# Patient Record
(demographics unavailable — no encounter records)

---

## 2025-03-20 NOTE — PHYSICAL EXAM
[Chaperone Present] : A chaperone was present in the examining room during all aspects of the physical examination [Appropriately responsive] : appropriately responsive [Alert] : alert [No Acute Distress] : no acute distress [Soft] : soft [Non-tender] : non-tender [Non-distended] : non-distended [No HSM] : No HSM [No Lesions] : no lesions [No Mass] : no mass [Oriented x3] : oriented x3 [Normal] : normal [No Discharge] : no discharge [The Right Breast Was Examined] : a normal appearance [Breast Mass Right Breast ___cm] : no was mass palpable [___] : a [unfilled] ~Ucm area of erythema [Enlargement Of The Left Breast] : swelling [Tenderness Of The Left Breast] : tenderness [___cm] : a ~M [unfilled] ~Ucm inferior lateral quadrant mass was palpated

## 2025-03-20 NOTE — PLAN
Anesthesia Start and Stop Event Times     Date Time Event    9/7/2023 1041 Ready for Procedure     1058 Anesthesia Start     1133 Anesthesia Stop        Responsible Staff  09/07/23    Name Role Begin End    Karel Suarez M.D. Anesth 1058 1133        Overtime Reason:  no overtime (within assigned shift)    Comments:                                                      
[FreeTextEntry1] : Breast pain   - US ordered   Irregular menses   - US ordered  - Lab work ordered   F/U in 1 month  All questions and concerns addressed during encounter. Pt. agreed to plan of care.

## 2025-03-20 NOTE — HISTORY OF PRESENT ILLNESS
[N] : Patient denies prior pregnancies [Menarche Age: ____] : age at menarche was [unfilled] [FreeTextEntry1] : 19 year old female presents for initial examination. Pt. presents today with complaints of left breast pain/swelling for >3 weeks. She states she feels two small palpable lumps and complains of redness at the nipple. She denies skin changes and nipple discharge. She also endorses a history of irregular menses. She states she will sometimes go greater than 4-6 months without a menses and has been doing so since she began menstruating.   [PGHxTotal] : 0 [PGHxPremature] : 0 [PGHxFullTerm] : 0 [PGHxAbortions] : 0 [[facility]] : 0 [PGHxABInduced] : 0 [PGHxABSpont] : 0 [PGHxEctopic] : 0 [PGHxMultBirths] : 0

## 2025-03-20 NOTE — REASON FOR VISIT
[Initial] : an initial consultation for [FreeTextEntry2] : breast pain and irregular menstrual cycles.